# Patient Record
Sex: FEMALE | Race: WHITE | NOT HISPANIC OR LATINO | Employment: UNEMPLOYED | URBAN - METROPOLITAN AREA
[De-identification: names, ages, dates, MRNs, and addresses within clinical notes are randomized per-mention and may not be internally consistent; named-entity substitution may affect disease eponyms.]

---

## 2017-10-04 RX ORDER — ALBUTEROL SULFATE 90 UG/1
AEROSOL, METERED RESPIRATORY (INHALATION)
COMMUNITY
Start: 2017-09-20 | End: 2019-04-18 | Stop reason: ALTCHOICE

## 2017-10-04 RX ORDER — LOSARTAN POTASSIUM 50 MG/1
TABLET ORAL
COMMUNITY
Start: 2016-07-27 | End: 2018-11-28

## 2018-07-28 DIAGNOSIS — Z12.11 COLON CANCER SCREENING: ICD-10-CM

## 2018-11-28 ENCOUNTER — OFFICE VISIT (OUTPATIENT)
Dept: ORTHOPEDICS | Facility: CLINIC | Age: 53
End: 2018-11-28
Payer: COMMERCIAL

## 2018-11-28 ENCOUNTER — HOSPITAL ENCOUNTER (EMERGENCY)
Facility: HOSPITAL | Age: 53
Discharge: HOME | End: 2018-11-28
Attending: EMERGENCY MEDICINE
Payer: COMMERCIAL

## 2018-11-28 VITALS
BODY MASS INDEX: 33.56 KG/M2 | DIASTOLIC BLOOD PRESSURE: 71 MMHG | HEART RATE: 90 BPM | WEIGHT: 145 LBS | HEIGHT: 55 IN | SYSTOLIC BLOOD PRESSURE: 150 MMHG

## 2018-11-28 VITALS
HEART RATE: 96 BPM | OXYGEN SATURATION: 99 % | RESPIRATION RATE: 16 BRPM | TEMPERATURE: 98 F | DIASTOLIC BLOOD PRESSURE: 68 MMHG | WEIGHT: 145 LBS | HEIGHT: 55 IN | BODY MASS INDEX: 33.56 KG/M2 | SYSTOLIC BLOOD PRESSURE: 144 MMHG

## 2018-11-28 DIAGNOSIS — S72.401A CLOSED FRACTURE OF DISTAL END OF RIGHT FEMUR, UNSPECIFIED FRACTURE MORPHOLOGY, INITIAL ENCOUNTER: Primary | ICD-10-CM

## 2018-11-28 DIAGNOSIS — S72.451A CLOSED DISPLACED SUPRACONDYLAR FRACTURE OF DISTAL END OF RIGHT FEMUR WITHOUT INTRACONDYLAR EXTENSION, INITIAL ENCOUNTER: Primary | ICD-10-CM

## 2018-11-28 DIAGNOSIS — S89.91XA RIGHT KNEE INJURY, INITIAL ENCOUNTER: ICD-10-CM

## 2018-11-28 PROCEDURE — 99999 PR PBB SHADOW E&M-EST. PATIENT-LVL III: CPT | Mod: PBBFAC,,, | Performed by: ORTHOPAEDIC SURGERY

## 2018-11-28 PROCEDURE — 99283 EMERGENCY DEPT VISIT LOW MDM: CPT | Mod: 25

## 2018-11-28 PROCEDURE — 29505 APPLICATION LONG LEG SPLINT: CPT | Mod: RT

## 2018-11-28 PROCEDURE — 99203 OFFICE O/P NEW LOW 30 MIN: CPT | Mod: S$GLB,,, | Performed by: ORTHOPAEDIC SURGERY

## 2018-11-28 PROCEDURE — 97760 ORTHOTIC MGMT&TRAING 1ST ENC: CPT | Mod: GP,S$GLB,, | Performed by: ORTHOPAEDIC SURGERY

## 2018-11-28 NOTE — PROGRESS NOTES
Past Medical History:   Diagnosis Date    DVT (deep venous thrombosis) 1986    2 post delivery; left leg    Essential hypertension 2016    History of hydrocephalus as a child     Hyperlipidemia 2014    Hypertensive retinopathy     Spina bifida        Past Surgical History:   Procedure Laterality Date     SECTION      SHUNT REMOVAL      as child    spina bifida surgery         Current Outpatient Medications   Medication Sig    albuterol 90 mcg/actuation inhaler 1-2 inhalations every 4-6 hours as needed for wheezing. Dispense spacer as needed.     No current facility-administered medications for this visit.        Review of patient's allergies indicates:   Allergen Reactions    Codeine Itching       Family History   Problem Relation Age of Onset    Diabetes Maternal Grandmother     Hyperlipidemia Mother     Hyperlipidemia Father        Social History     Socioeconomic History    Marital status:      Spouse name: Not on file    Number of children: Not on file    Years of education: Not on file    Highest education level: Not on file   Social Needs    Financial resource strain: Not on file    Food insecurity - worry: Not on file    Food insecurity - inability: Not on file    Transportation needs - medical: Not on file    Transportation needs - non-medical: Not on file   Occupational History    Not on file   Tobacco Use    Smoking status: Former Smoker    Smokeless tobacco: Never Used   Substance and Sexual Activity    Alcohol use: No    Drug use: No    Sexual activity: Not on file   Other Topics Concern    Not on file   Social History Narrative    Not on file       Chief Complaint:   Chief Complaint   Patient presents with    Right Femur - Injury       History of present illness:  This is a 53-year-old paraplegic female seen after falling while trying to transfer from her wheelchair to the toilet.  Date of injury was 2018.  Patient was x-rayed at a  local emergency room and a distal femur fracture was identified. Patient was placed a posterior splint.  She is seen here for follow-up today.  Patient had a left tibia fracture last year treated with splinting without incident.  Patient does not transfer or walk using her legs.  She is in no pain.      Review of Systems:    Constitution: Negative for chills, fever, and sweats.  Negative for unexplained weight loss.    HENT:  Negative for headaches and blurry vision.    Cardiovascular:Negative for chest pain or irregular heart beat. Negative for hypertension.    Respiratory:  Negative for cough and shortness of breath.    Gastrointestinal: Negative for abdominal pain, heartburn, melena, nausea, and vomitting.    Genitourinary:  Negative bladder incontinence and dysuria.    Musculoskeletal:  See HPI    Neurological: Negative for numbness.    Psychiatric/Behavioral: Negative for depression.  The patient is not nervous/anxious.      Endocrine: Negative for polyuria    Hematologic/Lymphatic: Negative for bleeding problem.  Does not bruise/bleed easily.    Skin: Negative for poor would healing and rash      Physical Examination:    Vital Signs:    Vitals:    11/28/18 1023   BP: (!) 150/71   Pulse: 90       Body mass index is 33.7 kg/m².    This a well-developed, well nourished patient in no acute distress.  They are alert and oriented and cooperative to examination.  Pt.  Comes in in her wheelchair    Examination of the right leg shows some mild swelling around the knee and distal femur area. No tenderness. No gross crepitus.  Patient has some contractures in her legs and her knees are fixed at about a 30 degree position. No real light touch sensation.  Brisk capillary refill in all digits.    Examination of the left leg shows no abnormalities.  Brisk capillary refill.  No light touch sensation.    X-rays:  X-ray of the right knee is available from last night which show a minimally displaced comminuted distal femur  fracture     Assessment::  Right distal femur fracture    Plan:  I reviewed the findings with her and her  today. We will treat her non operatively in a brace.  Placed her in a locking range of motion brace in her position of comfort.  Follow up in 3 weeks with another x-ray of the right knee.    We performed a custom orthotic/brace fitting, adjusting and training with the patient. The patient demonstrated understanding and proper care. This was performed for 15 minutes.    This note was created using [employer] voice recognition software that occasionally misinterpreted phrases or words.    Consult note is delivered via Epic messaging service.

## 2018-11-28 NOTE — ED PROVIDER NOTES
"Encounter Date: 2018    SCRIBE #1 NOTE: I, Sylvia Zamorano, am scribing for, and in the presence of, Dr. Milligan.       History     Chief Complaint   Patient presents with    Knee Pain     Fell when getting out of her wheelchair at home and got right knee jammed and is hurting her       Time seen by provider: 1:47 AM on 2018    Nan Caraballo is a 53 y.o. female who presents to the ED complaining of right knee pain s/p fall 5 hours PTA. Pt states that she fell transferring from her wheelchair to the toilet. She reports hearing a "pop" when her knee hit the floor. Pt is paraplegic and notes that she does not have much feeling in her legs but notes some pain. The patient denies other symptoms at this time. PMHx includes DVT and HLD. No pertinent SHx. Allergies include codeine.      The history is provided by the patient.     Review of patient's allergies indicates:   Allergen Reactions    Codeine Itching     Past Medical History:   Diagnosis Date    DVT (deep venous thrombosis)     2 post delivery; left leg    Essential hypertension 2016    History of hydrocephalus as a child     Hyperlipidemia 2014    Hypertensive retinopathy     Spina bifida      Past Surgical History:   Procedure Laterality Date     SECTION      SHUNT REMOVAL      as child    spina bifida surgery       Family History   Problem Relation Age of Onset    Diabetes Maternal Grandmother     Hyperlipidemia Mother     Hyperlipidemia Father      Social History     Tobacco Use    Smoking status: Former Smoker    Smokeless tobacco: Never Used   Substance Use Topics    Alcohol use: No    Drug use: No     Review of Systems   Constitutional: Negative for fever.   HENT: Negative for congestion.    Eyes: Negative for visual disturbance.   Respiratory: Negative for wheezing.    Cardiovascular: Negative for chest pain.   Gastrointestinal: Negative for abdominal pain.   Genitourinary: Negative for dysuria. "   Musculoskeletal: Positive for arthralgias (right knee). Negative for joint swelling.   Skin: Negative for rash.   Neurological: Negative for syncope.   Hematological: Does not bruise/bleed easily.   Psychiatric/Behavioral: Negative for confusion.       Physical Exam     Initial Vitals [11/28/18 0138]   BP Pulse Resp Temp SpO2   (!) 180/81 109 16 98.2 °F (36.8 °C) 100 %      MAP       --         Physical Exam    Nursing note and vitals reviewed.  Constitutional: She appears well-nourished. She is Obese .   HENT:   Head: Normocephalic and atraumatic.   Eyes: Conjunctivae and EOM are normal.   Neck: Normal range of motion. Neck supple. No thyroid mass present.   Cardiovascular: Normal rate, regular rhythm and normal heart sounds. Exam reveals no gallop and no friction rub.    No murmur heard.  Pulmonary/Chest: Breath sounds normal. She has no wheezes. She has no rhonchi. She has no rales.   Abdominal: Soft. Normal appearance and bowel sounds are normal. There is no tenderness.   Musculoskeletal:        Right knee: She exhibits no swelling and no ecchymosis.   Atrophy of both lower extremities. Right knee and left knee are symmetric. Right knee is not swollen, red, or ecchymotic, and is without laxity. No pain with manipulation.   Neurological: She is alert and oriented to person, place, and time. She has normal strength. No cranial nerve deficit or sensory deficit.   Skin: Skin is warm and dry. No rash noted. No erythema.   Psychiatric: She has a normal mood and affect. Her speech is normal. Cognition and memory are normal.         ED Course   Splint Application  Date/Time: 11/28/2018 6:15 AM  Performed by: Cassius Milligan MD  Authorized by: Cassius Milligan MD   Consent Done: Not Needed  Location details: right leg  Splint type: long leg  Supplies used: cotton padding and Ortho-Glass  Post-procedure: The splinted body part was neurovascularly unchanged following the procedure.  Patient tolerance: Patient tolerated  the procedure well with no immediate complications        Labs Reviewed - No data to display       Imaging Results    None          Medical Decision Making:   History:   Old Medical Records: I decided to obtain old medical records.  Clinical Tests:   Radiological Study: Ordered and Reviewed  ED Management:  This patient was interviewed and examined emergently.  Initial vital signs are stable. X-ray of the affected knee does indicate a distal, comminuted, mildly angulated femur fracture that is closed.  There are soft adjacent compartments.  Patient is neurovascularly intact distally.  Ten years ago the patient incurred a proximal left tibial fracture which was left to heal in a splint.  At this time she reports no pain, the fracture is not intra-articular, it is not open and I do think the patient can be splinted for outpatient follow-up/treatment (which will most likely be conservative with splinting or casting).  The patient was asked if she wanted pain meds to be discharged and the stated I do not need them since I cannot feel the area.  She has her own wheelchair which she uses.  She is asked to follow up with an orthopedist as soon as possible regarding this injury and ultimate treatment.  She is asked to return to the ER for any new, concerning, or worsening symptoms, including leg discoloration, leg coldness, tight leg compartments that worsen or any pain.  Patient is agreeable with this plan for follow-up and she and her male  are discharged in stable condition with him as a .            Scribe Attestation:   Scribe #1: I performed the above scribed service and the documentation accurately describes the services I performed. I attest to the accuracy of the note.    I, Dr. Cassius Milligan, personally performed the services described in this documentation. All medical record entries made by the scribe were at my direction and in my presence.  I have reviewed the chart and agree that the record  reflects my personal performance and is accurate and complete. Cassius Milligan MD.  6:13 AM 11/28/2018             Clinical Impression:   The primary encounter diagnosis was Closed fracture of distal end of right femur, unspecified fracture morphology, initial encounter. A diagnosis of Right knee injury, initial encounter was also pertinent to this visit.      Disposition:   Disposition: Discharged  Condition: Stable                        Cassius Milligan MD  11/28/18 0615       Cassius Milligan MD  11/28/18 0616

## 2018-12-18 DIAGNOSIS — M25.561 RIGHT KNEE PAIN, UNSPECIFIED CHRONICITY: Primary | ICD-10-CM

## 2018-12-19 ENCOUNTER — HOSPITAL ENCOUNTER (OUTPATIENT)
Dept: RADIOLOGY | Facility: HOSPITAL | Age: 53
Discharge: HOME | End: 2018-12-19
Attending: ORTHOPAEDIC SURGERY
Payer: COMMERCIAL

## 2018-12-19 ENCOUNTER — OFFICE VISIT (OUTPATIENT)
Dept: ORTHOPEDICS | Facility: CLINIC | Age: 53
End: 2018-12-19
Payer: COMMERCIAL

## 2018-12-19 VITALS
WEIGHT: 145 LBS | BODY MASS INDEX: 33.56 KG/M2 | SYSTOLIC BLOOD PRESSURE: 147 MMHG | DIASTOLIC BLOOD PRESSURE: 67 MMHG | HEIGHT: 55 IN | HEART RATE: 69 BPM

## 2018-12-19 DIAGNOSIS — M25.561 RIGHT KNEE PAIN, UNSPECIFIED CHRONICITY: ICD-10-CM

## 2018-12-19 DIAGNOSIS — S72.451D CLOSED DISPLACED SUPRACONDYLAR FRACTURE OF DISTAL END OF RIGHT FEMUR WITHOUT INTRACONDYLAR EXTENSION WITH ROUTINE HEALING, SUBSEQUENT ENCOUNTER: Primary | ICD-10-CM

## 2018-12-19 PROCEDURE — 99999 PR PBB SHADOW E&M-EST. PATIENT-LVL III: CPT | Mod: PBBFAC,,, | Performed by: ORTHOPAEDIC SURGERY

## 2018-12-19 PROCEDURE — 73560 X-RAY EXAM OF KNEE 1 OR 2: CPT | Mod: 26,RT,, | Performed by: RADIOLOGY

## 2018-12-19 PROCEDURE — 73560 X-RAY EXAM OF KNEE 1 OR 2: CPT | Mod: TC,PO,RT

## 2018-12-19 PROCEDURE — 99213 OFFICE O/P EST LOW 20 MIN: CPT | Mod: S$GLB,,, | Performed by: ORTHOPAEDIC SURGERY

## 2018-12-19 NOTE — PROGRESS NOTES
Past Medical History:   Diagnosis Date    DVT (deep venous thrombosis) 1986    2 post delivery; left leg    Essential hypertension 2016    History of hydrocephalus as a child     Hyperlipidemia 2014    Hypertensive retinopathy     Spina bifida        Past Surgical History:   Procedure Laterality Date     SECTION      SHUNT REMOVAL      as child    spina bifida surgery         Current Outpatient Medications   Medication Sig    albuterol 90 mcg/actuation inhaler 1-2 inhalations every 4-6 hours as needed for wheezing. Dispense spacer as needed.     No current facility-administered medications for this visit.        Review of patient's allergies indicates:   Allergen Reactions    Codeine Itching       Family History   Problem Relation Age of Onset    Diabetes Maternal Grandmother     Hyperlipidemia Mother     Hyperlipidemia Father        Social History     Socioeconomic History    Marital status:      Spouse name: Not on file    Number of children: Not on file    Years of education: Not on file    Highest education level: Not on file   Social Needs    Financial resource strain: Not on file    Food insecurity - worry: Not on file    Food insecurity - inability: Not on file    Transportation needs - medical: Not on file    Transportation needs - non-medical: Not on file   Occupational History    Not on file   Tobacco Use    Smoking status: Former Smoker    Smokeless tobacco: Never Used   Substance and Sexual Activity    Alcohol use: No    Drug use: No    Sexual activity: Not on file   Other Topics Concern    Not on file   Social History Narrative    Not on file       Chief Complaint:   Chief Complaint   Patient presents with    Leg Pain     R femur fx 3wk f/u       History of present illness:  This is a 53-year-old paraplegic female seen after falling while trying to transfer from her wheelchair to the toilet.  Date of injury was 2018.  Patient was  x-rayed at a local emergency room and a distal femur fracture was identified.  She is in the range of motion brace.  She is seen here for follow-up today.  Patient had a left tibia fracture last year treated with splinting without incident.  Patient does not transfer or walk using her legs.  She is in no pain.      Review of Systems:    Constitution: Negative for chills, fever, and sweats.  Negative for unexplained weight loss.    HENT:  Negative for headaches and blurry vision.    Cardiovascular:Negative for chest pain or irregular heart beat. Negative for hypertension.    Respiratory:  Negative for cough and shortness of breath.    Gastrointestinal: Negative for abdominal pain, heartburn, melena, nausea, and vomitting.    Genitourinary:  Negative bladder incontinence and dysuria.    Musculoskeletal:  See HPI    Neurological: Negative for numbness.    Psychiatric/Behavioral: Negative for depression.  The patient is not nervous/anxious.      Endocrine: Negative for polyuria    Hematologic/Lymphatic: Negative for bleeding problem.  Does not bruise/bleed easily.    Skin: Negative for poor would healing and rash      Physical Examination:    Vital Signs:    Vitals:    12/19/18 1023   BP: (!) 147/67   Pulse: 69       Body mass index is 33.7 kg/m².    This a well-developed, well nourished patient in no acute distress.  They are alert and oriented and cooperative to examination.  Pt.  Comes in in her wheelchair    Examination of the right leg shows some mild swelling around the knee and distal femur area. No tenderness. No gross crepitus.  Patient has some contractures in her legs and her knees are fixed at about a 30 degree position. No real light touch sensation.  Brisk capillary refill in all digits.    X-rays:  X-ray of the right knee is ordered and reviewed which show a minimally displaced comminuted distal femur fracture with possible some early callus formation     Assessment::  Right distal femur fracture    Plan:   I reviewed the findings with her and her  today. We will continue to treat her non operatively in a brace.  Continue the brace.  Follow up in 3 weeks with another x-ray of the right knee.    This note was created using [email or web address] voice recognition software that occasionally misinterpreted phrases or words.    Consult note is delivered via Epic messaging service.

## 2019-01-03 DIAGNOSIS — M25.561 RIGHT KNEE PAIN, UNSPECIFIED CHRONICITY: Primary | ICD-10-CM

## 2019-01-07 ENCOUNTER — TELEPHONE (OUTPATIENT)
Dept: ORTHOPEDICS | Facility: CLINIC | Age: 54
End: 2019-01-07

## 2019-01-07 NOTE — TELEPHONE ENCOUNTER
----- Message from Gabriela Floyd sent at 1/7/2019  9:19 AM CST -----  Contact: Larru-  Type: Needs Medical Advice    Who Called:  Greg-spouse  Best Call Back Number: [phone] (home)   Additional Information:was told to call about changing appt for this Thursday.

## 2019-01-09 ENCOUNTER — HOSPITAL ENCOUNTER (OUTPATIENT)
Dept: RADIOLOGY | Facility: HOSPITAL | Age: 54
Discharge: HOME | End: 2019-01-09
Attending: ORTHOPAEDIC SURGERY
Payer: COMMERCIAL

## 2019-01-09 ENCOUNTER — OFFICE VISIT (OUTPATIENT)
Dept: ORTHOPEDICS | Facility: CLINIC | Age: 54
End: 2019-01-09
Payer: COMMERCIAL

## 2019-01-09 VITALS
SYSTOLIC BLOOD PRESSURE: 140 MMHG | WEIGHT: 145 LBS | HEART RATE: 70 BPM | BODY MASS INDEX: 33.56 KG/M2 | DIASTOLIC BLOOD PRESSURE: 71 MMHG | HEIGHT: 55 IN

## 2019-01-09 DIAGNOSIS — M25.561 RIGHT KNEE PAIN, UNSPECIFIED CHRONICITY: ICD-10-CM

## 2019-01-09 DIAGNOSIS — S72.451D CLOSED DISPLACED SUPRACONDYLAR FRACTURE OF DISTAL END OF RIGHT FEMUR WITHOUT INTRACONDYLAR EXTENSION WITH ROUTINE HEALING, SUBSEQUENT ENCOUNTER: Primary | ICD-10-CM

## 2019-01-09 PROCEDURE — 73560 XR KNEE 1 OR 2 VIEW RIGHT: ICD-10-PCS | Mod: 26,RT,, | Performed by: RADIOLOGY

## 2019-01-09 PROCEDURE — 99213 OFFICE O/P EST LOW 20 MIN: CPT | Mod: S$GLB,,, | Performed by: ORTHOPAEDIC SURGERY

## 2019-01-09 PROCEDURE — 99999 PR PBB SHADOW E&M-EST. PATIENT-LVL III: ICD-10-PCS | Mod: PBBFAC,,, | Performed by: ORTHOPAEDIC SURGERY

## 2019-01-09 PROCEDURE — 73560 X-RAY EXAM OF KNEE 1 OR 2: CPT | Mod: 26,RT,, | Performed by: RADIOLOGY

## 2019-01-09 PROCEDURE — 99213 PR OFFICE/OUTPT VISIT, EST, LEVL III, 20-29 MIN: ICD-10-PCS | Mod: S$GLB,,, | Performed by: ORTHOPAEDIC SURGERY

## 2019-01-09 PROCEDURE — 99999 PR PBB SHADOW E&M-EST. PATIENT-LVL III: CPT | Mod: PBBFAC,,, | Performed by: ORTHOPAEDIC SURGERY

## 2019-01-09 PROCEDURE — 73560 X-RAY EXAM OF KNEE 1 OR 2: CPT | Mod: TC,PO,RT

## 2019-01-09 NOTE — PROGRESS NOTES
Past Medical History:   Diagnosis Date    DVT (deep venous thrombosis) 1986    2 post delivery; left leg    Essential hypertension 2016    History of hydrocephalus as a child     Hyperlipidemia 2014    Hypertensive retinopathy     Spina bifida        Past Surgical History:   Procedure Laterality Date     SECTION      SHUNT REMOVAL      as child    spina bifida surgery         Current Outpatient Medications   Medication Sig    albuterol 90 mcg/actuation inhaler 1-2 inhalations every 4-6 hours as needed for wheezing. Dispense spacer as needed.     No current facility-administered medications for this visit.        Review of patient's allergies indicates:   Allergen Reactions    Codeine Itching       Family History   Problem Relation Age of Onset    Diabetes Maternal Grandmother     Hyperlipidemia Mother     Hyperlipidemia Father        Social History     Socioeconomic History    Marital status:      Spouse name: Not on file    Number of children: Not on file    Years of education: Not on file    Highest education level: Not on file   Social Needs    Financial resource strain: Not on file    Food insecurity - worry: Not on file    Food insecurity - inability: Not on file    Transportation needs - medical: Not on file    Transportation needs - non-medical: Not on file   Occupational History    Not on file   Tobacco Use    Smoking status: Former Smoker    Smokeless tobacco: Never Used   Substance and Sexual Activity    Alcohol use: No    Drug use: No    Sexual activity: Not on file   Other Topics Concern    Not on file   Social History Narrative    Not on file       Chief Complaint:   Chief Complaint   Patient presents with    Knee Pain     right knee 3wk f/u       History of present illness:  This is a 53-year-old paraplegic female seen after falling while trying to transfer from her wheelchair to the toilet.  Date of injury was 2018.  Patient was  x-rayed at a local emergency room and a distal femur fracture was identified.  She is in the range of motion brace.  She is seen here for follow-up today.  Patient had a left tibia fracture last year treated with splinting without incident.  Patient does not transfer or walk using her legs.  She is in no pain.      Review of Systems:    Constitution: Negative for chills, fever, and sweats.  Negative for unexplained weight loss.    HENT:  Negative for headaches and blurry vision.    Cardiovascular:Negative for chest pain or irregular heart beat. Negative for hypertension.    Respiratory:  Negative for cough and shortness of breath.    Gastrointestinal: Negative for abdominal pain, heartburn, melena, nausea, and vomitting.    Genitourinary:  Negative bladder incontinence and dysuria.    Musculoskeletal:  See HPI    Neurological: Negative for numbness.    Psychiatric/Behavioral: Negative for depression.  The patient is not nervous/anxious.      Endocrine: Negative for polyuria    Hematologic/Lymphatic: Negative for bleeding problem.  Does not bruise/bleed easily.    Skin: Negative for poor would healing and rash      Physical Examination:    Vital Signs:    Vitals:    01/09/19 0954   BP: (!) 140/71   Pulse: 70       Body mass index is 33.7 kg/m².    This a well-developed, well nourished patient in no acute distress.  They are alert and oriented and cooperative to examination.  Pt.  Comes in in her wheelchair    Examination of the right leg shows some mild swelling around the knee and distal femur area. No tenderness. No gross crepitus.  Patient has some contractures in her legs and her knees are fixed at about a 30 degree position. No real light touch sensation.  Brisk capillary refill in all digits.    X-rays:  X-ray of the right knee is ordered and reviewed which show a minimally displaced comminuted distal femur fracture with some early callus formation     Assessment::  Right distal femur fracture    Plan:  I  reviewed the findings with her and her  today. she can start to come out of the brace more and more.  Follow up in 4 weeks with another x-ray of the right knee.    This note was created using [employer] voice recognition software that occasionally misinterpreted phrases or words.    Consult note is delivered via Epic messaging service.

## 2019-02-08 DIAGNOSIS — M25.561 RIGHT KNEE PAIN, UNSPECIFIED CHRONICITY: Primary | ICD-10-CM

## 2019-02-14 ENCOUNTER — OFFICE VISIT (OUTPATIENT)
Dept: ORTHOPEDICS | Facility: CLINIC | Age: 54
End: 2019-02-14
Payer: COMMERCIAL

## 2019-02-14 ENCOUNTER — HOSPITAL ENCOUNTER (OUTPATIENT)
Dept: RADIOLOGY | Facility: HOSPITAL | Age: 54
Discharge: HOME | End: 2019-02-14
Attending: ORTHOPAEDIC SURGERY
Payer: COMMERCIAL

## 2019-02-14 VITALS
SYSTOLIC BLOOD PRESSURE: 169 MMHG | DIASTOLIC BLOOD PRESSURE: 79 MMHG | HEART RATE: 77 BPM | BODY MASS INDEX: 33.56 KG/M2 | WEIGHT: 145 LBS | HEIGHT: 55 IN

## 2019-02-14 DIAGNOSIS — M25.561 RIGHT KNEE PAIN, UNSPECIFIED CHRONICITY: ICD-10-CM

## 2019-02-14 DIAGNOSIS — S72.451D CLOSED DISPLACED SUPRACONDYLAR FRACTURE OF DISTAL END OF RIGHT FEMUR WITHOUT INTRACONDYLAR EXTENSION WITH ROUTINE HEALING, SUBSEQUENT ENCOUNTER: Primary | ICD-10-CM

## 2019-02-14 PROCEDURE — 73560 XR KNEE 1 OR 2 VIEW RIGHT: ICD-10-PCS | Mod: 26,RT,, | Performed by: RADIOLOGY

## 2019-02-14 PROCEDURE — 99213 OFFICE O/P EST LOW 20 MIN: CPT | Mod: S$GLB,,, | Performed by: ORTHOPAEDIC SURGERY

## 2019-02-14 PROCEDURE — 99999 PR PBB SHADOW E&M-EST. PATIENT-LVL III: ICD-10-PCS | Mod: PBBFAC,,, | Performed by: ORTHOPAEDIC SURGERY

## 2019-02-14 PROCEDURE — 99999 PR PBB SHADOW E&M-EST. PATIENT-LVL III: CPT | Mod: PBBFAC,,, | Performed by: ORTHOPAEDIC SURGERY

## 2019-02-14 PROCEDURE — 73560 X-RAY EXAM OF KNEE 1 OR 2: CPT | Mod: 26,RT,, | Performed by: RADIOLOGY

## 2019-02-14 PROCEDURE — 73560 X-RAY EXAM OF KNEE 1 OR 2: CPT | Mod: TC,PN,RT

## 2019-02-14 PROCEDURE — 99213 PR OFFICE/OUTPT VISIT, EST, LEVL III, 20-29 MIN: ICD-10-PCS | Mod: S$GLB,,, | Performed by: ORTHOPAEDIC SURGERY

## 2019-02-14 NOTE — PROGRESS NOTES
Past Medical History:   Diagnosis Date    DVT (deep venous thrombosis) 1986    2 post delivery; left leg    Essential hypertension 2016    History of hydrocephalus as a child     Hyperlipidemia 2014    Hypertensive retinopathy     Spina bifida        Past Surgical History:   Procedure Laterality Date     SECTION      SHUNT REMOVAL      as child    spina bifida surgery         Current Outpatient Medications   Medication Sig    albuterol 90 mcg/actuation inhaler 1-2 inhalations every 4-6 hours as needed for wheezing. Dispense spacer as needed.     No current facility-administered medications for this visit.        Review of patient's allergies indicates:   Allergen Reactions    Codeine Itching       Family History   Problem Relation Age of Onset    Diabetes Maternal Grandmother     Hyperlipidemia Mother     Hyperlipidemia Father        Social History     Socioeconomic History    Marital status:      Spouse name: Not on file    Number of children: Not on file    Years of education: Not on file    Highest education level: Not on file   Social Needs    Financial resource strain: Not on file    Food insecurity - worry: Not on file    Food insecurity - inability: Not on file    Transportation needs - medical: Not on file    Transportation needs - non-medical: Not on file   Occupational History    Not on file   Tobacco Use    Smoking status: Former Smoker    Smokeless tobacco: Never Used   Substance and Sexual Activity    Alcohol use: No    Drug use: No    Sexual activity: Not on file   Other Topics Concern    Not on file   Social History Narrative    Not on file       Chief Complaint:   Chief Complaint   Patient presents with    Leg Injury     right femur fracture follow up [facility] 18        History of present illness:  This is a 53-year-old paraplegic female seen after falling while trying to transfer from her wheelchair to the toilet.  Date of injury was November  27, 2018.  Patient was x-rayed at a local emergency room and a distal femur fracture was identified. She is seen here for follow-up today.  Patient had a left tibia fracture last year treated with splinting without incident.  Patient does not transfer or walk using her legs.  She is in no pain. Popping is improving.      Review of Systems:    Musculoskeletal:  See HPI      Physical Examination:    Vital Signs:    Vitals:    02/14/19 1008   BP: (!) 169/79   Pulse: 77       Body mass index is 33.7 kg/m².    This a well-developed, well nourished patient in no acute distress.  They are alert and oriented and cooperative to examination.  Pt.  Comes in in her wheelchair    Examination of the right leg shows some mild swelling around the knee and distal femur area. No tenderness. No gross crepitus.  Patient has some contractures in her legs and her knees are fixed at about a 30 degree position. No real light touch sensation.  Brisk capillary refill in all digits.    X-rays:  X-ray of the right knee is ordered and reviewed which show a minimally displaced comminuted distal femur fracture with some continued callus formation     Assessment::  Right distal femur fracture    Plan:  I reviewed the findings with her and her  today.  Follow up in 2 months with final x-ray of the right knee.    This note was created using [employer] voice recognition software that occasionally misinterpreted phrases or words.    Consult note is delivered via Epic messaging service.

## 2019-04-16 DIAGNOSIS — M25.561 RIGHT KNEE PAIN, UNSPECIFIED CHRONICITY: Primary | ICD-10-CM

## 2019-04-18 ENCOUNTER — OFFICE VISIT (OUTPATIENT)
Dept: ORTHOPEDICS | Facility: CLINIC | Age: 54
End: 2019-04-18
Payer: COMMERCIAL

## 2019-04-18 ENCOUNTER — HOSPITAL ENCOUNTER (OUTPATIENT)
Dept: RADIOLOGY | Facility: HOSPITAL | Age: 54
Discharge: HOME | End: 2019-04-18
Attending: ORTHOPAEDIC SURGERY
Payer: COMMERCIAL

## 2019-04-18 VITALS
DIASTOLIC BLOOD PRESSURE: 73 MMHG | WEIGHT: 145 LBS | SYSTOLIC BLOOD PRESSURE: 151 MMHG | BODY MASS INDEX: 33.56 KG/M2 | HEIGHT: 55 IN | HEART RATE: 83 BPM

## 2019-04-18 DIAGNOSIS — S72.451D CLOSED DISPLACED SUPRACONDYLAR FRACTURE OF DISTAL END OF RIGHT FEMUR WITHOUT INTRACONDYLAR EXTENSION WITH ROUTINE HEALING, SUBSEQUENT ENCOUNTER: Primary | ICD-10-CM

## 2019-04-18 DIAGNOSIS — M25.561 RIGHT KNEE PAIN, UNSPECIFIED CHRONICITY: ICD-10-CM

## 2019-04-18 PROCEDURE — 73560 X-RAY EXAM OF KNEE 1 OR 2: CPT | Mod: TC,PN,RT

## 2019-04-18 PROCEDURE — 99213 OFFICE O/P EST LOW 20 MIN: CPT | Mod: S$GLB,,, | Performed by: ORTHOPAEDIC SURGERY

## 2019-04-18 PROCEDURE — 99999 PR PBB SHADOW E&M-EST. PATIENT-LVL III: ICD-10-PCS | Mod: PBBFAC,,, | Performed by: ORTHOPAEDIC SURGERY

## 2019-04-18 PROCEDURE — 99999 PR PBB SHADOW E&M-EST. PATIENT-LVL III: CPT | Mod: PBBFAC,,, | Performed by: ORTHOPAEDIC SURGERY

## 2019-04-18 PROCEDURE — 73560 XR KNEE 1 OR 2 VIEW RIGHT: ICD-10-PCS | Mod: 26,RT,, | Performed by: RADIOLOGY

## 2019-04-18 PROCEDURE — 99213 PR OFFICE/OUTPT VISIT, EST, LEVL III, 20-29 MIN: ICD-10-PCS | Mod: S$GLB,,, | Performed by: ORTHOPAEDIC SURGERY

## 2019-04-18 PROCEDURE — 73560 X-RAY EXAM OF KNEE 1 OR 2: CPT | Mod: 26,RT,, | Performed by: RADIOLOGY

## 2019-04-18 NOTE — PROGRESS NOTES
Past Medical History:   Diagnosis Date    DVT (deep venous thrombosis) 1986    2 post delivery; left leg    Essential hypertension 2016    History of hydrocephalus as a child     Hyperlipidemia 2014    Hypertensive retinopathy     Spina bifida        Past Surgical History:   Procedure Laterality Date     SECTION      SHUNT REMOVAL      as child    spina bifida surgery         No current outpatient medications on file.     No current facility-administered medications for this visit.        Review of patient's allergies indicates:   Allergen Reactions    Codeine Itching       Family History   Problem Relation Age of Onset    Diabetes Maternal Grandmother     Hyperlipidemia Mother     Hyperlipidemia Father        Social History     Socioeconomic History    Marital status:      Spouse name: Not on file    Number of children: Not on file    Years of education: Not on file    Highest education level: Not on file   Occupational History    Not on file   Social Needs    Financial resource strain: Not on file    Food insecurity:     Worry: Not on file     Inability: Not on file    Transportation needs:     Medical: Not on file     Non-medical: Not on file   Tobacco Use    Smoking status: Former Smoker    Smokeless tobacco: Never Used   Substance and Sexual Activity    Alcohol use: No    Drug use: No    Sexual activity: Not on file   Lifestyle    Physical activity:     Days per week: Not on file     Minutes per session: Not on file    Stress: Not on file   Relationships    Social connections:     Talks on phone: Not on file     Gets together: Not on file     Attends Christianity service: Not on file     Active member of club or organization: Not on file     Attends meetings of clubs or organizations: Not on file     Relationship status: Not on file   Other Topics Concern    Not on file   Social History Narrative    Not on file       Chief Complaint:   Chief Complaint    Patient presents with    Leg Injury     left femur fracture DOI 11/27/18        History of present illness:  This is a 54-year-old paraplegic female seen after falling while trying to transfer from her wheelchair to the toilet.  Date of injury was November 27, 2018.  Patient was x-rayed at a local emergency room and a distal femur fracture was identified. She is seen here for follow-up today.  Patient had a left tibia fracture last year treated with splinting without incident.  Patient does not transfer or walk using her legs.  She is in no pain. Popping is improving.      Review of Systems:    Musculoskeletal:  See HPI      Physical Examination:    Vital Signs:    Vitals:    04/18/19 0958   BP: (!) 151/73   Pulse: 83       Body mass index is 33.7 kg/m².    This a well-developed, well nourished patient in no acute distress.  They are alert and oriented and cooperative to examination.  Pt.  Comes in in her wheelchair    Examination of the right leg shows some mild swelling around the knee and distal femur area. No tenderness. No gross crepitus.  Patient has some contractures in her legs and her knees are fixed at about a 30 degree position. No real light touch sensation.  Brisk capillary refill in all digits.    X-rays:  X-ray of the right knee is ordered and reviewed which show a minimally displaced comminuted distal femur fracture with some continued callus formation     Assessment::  Right distal femur fracture    Plan:  I reviewed the findings with her and her  today.  Follow up in 3 months with final x-ray of the right knee.    This note was created using [employer] voice recognition software that occasionally misinterpreted phrases or words.    Consult note is delivered via Epic messaging service.

## 2019-07-09 DIAGNOSIS — M25.561 RIGHT KNEE PAIN, UNSPECIFIED CHRONICITY: Primary | ICD-10-CM

## 2019-07-11 ENCOUNTER — HOSPITAL ENCOUNTER (OUTPATIENT)
Dept: RADIOLOGY | Facility: HOSPITAL | Age: 54
Discharge: HOME | End: 2019-07-11
Attending: ORTHOPAEDIC SURGERY
Payer: COMMERCIAL

## 2019-07-11 ENCOUNTER — OFFICE VISIT (OUTPATIENT)
Dept: ORTHOPEDICS | Facility: CLINIC | Age: 54
End: 2019-07-11
Payer: COMMERCIAL

## 2019-07-11 VITALS — WEIGHT: 145 LBS | BODY MASS INDEX: 33.56 KG/M2 | RESPIRATION RATE: 18 BRPM | HEIGHT: 55 IN

## 2019-07-11 DIAGNOSIS — M25.561 RIGHT KNEE PAIN, UNSPECIFIED CHRONICITY: ICD-10-CM

## 2019-07-11 DIAGNOSIS — S72.451D CLOSED DISPLACED SUPRACONDYLAR FRACTURE OF DISTAL END OF RIGHT FEMUR WITHOUT INTRACONDYLAR EXTENSION WITH ROUTINE HEALING, SUBSEQUENT ENCOUNTER: Primary | ICD-10-CM

## 2019-07-11 PROCEDURE — 99213 PR OFFICE/OUTPT VISIT, EST, LEVL III, 20-29 MIN: ICD-10-PCS | Mod: S$GLB,,, | Performed by: ORTHOPAEDIC SURGERY

## 2019-07-11 PROCEDURE — 99999 PR PBB SHADOW E&M-EST. PATIENT-LVL III: CPT | Mod: PBBFAC,,, | Performed by: ORTHOPAEDIC SURGERY

## 2019-07-11 PROCEDURE — 99213 OFFICE O/P EST LOW 20 MIN: CPT | Mod: S$GLB,,, | Performed by: ORTHOPAEDIC SURGERY

## 2019-07-11 PROCEDURE — 99999 PR PBB SHADOW E&M-EST. PATIENT-LVL III: ICD-10-PCS | Mod: PBBFAC,,, | Performed by: ORTHOPAEDIC SURGERY

## 2019-07-11 PROCEDURE — 73560 X-RAY EXAM OF KNEE 1 OR 2: CPT | Mod: TC,PN,RT

## 2019-07-11 PROCEDURE — 73560 XR KNEE 1 OR 2 VIEW RIGHT: ICD-10-PCS | Mod: 26,RT,, | Performed by: RADIOLOGY

## 2019-07-11 PROCEDURE — 73560 X-RAY EXAM OF KNEE 1 OR 2: CPT | Mod: 26,RT,, | Performed by: RADIOLOGY

## 2019-07-11 NOTE — PROGRESS NOTES
Past Medical History:   Diagnosis Date    DVT (deep venous thrombosis) 1986    2 post delivery; left leg    Essential hypertension 2016    History of hydrocephalus as a child     Hyperlipidemia 2014    Hypertensive retinopathy     Spina bifida        Past Surgical History:   Procedure Laterality Date     SECTION      SHUNT REMOVAL      as child    spina bifida surgery         No current outpatient medications on file.     No current facility-administered medications for this visit.        Review of patient's allergies indicates:   Allergen Reactions    Codeine Itching       Family History   Problem Relation Age of Onset    Diabetes Maternal Grandmother     Hyperlipidemia Mother     Hyperlipidemia Father        Social History     Socioeconomic History    Marital status:      Spouse name: Not on file    Number of children: Not on file    Years of education: Not on file    Highest education level: Not on file   Occupational History    Not on file   Social Needs    Financial resource strain: Not on file    Food insecurity:     Worry: Not on file     Inability: Not on file    Transportation needs:     Medical: Not on file     Non-medical: Not on file   Tobacco Use    Smoking status: Former Smoker    Smokeless tobacco: Never Used   Substance and Sexual Activity    Alcohol use: No    Drug use: No    Sexual activity: Not on file   Lifestyle    Physical activity:     Days per week: Not on file     Minutes per session: Not on file    Stress: Not on file   Relationships    Social connections:     Talks on phone: Not on file     Gets together: Not on file     Attends Mandaeism service: Not on file     Active member of club or organization: Not on file     Attends meetings of clubs or organizations: Not on file     Relationship status: Not on file   Other Topics Concern    Not on file   Social History Narrative    Not on file       Chief Complaint:   Chief Complaint    Patient presents with    Leg Pain     right femur fracture follow up DOI 11/27/18       History of present illness:  This is a 54-year-old paraplegic female seen after falling while trying to transfer from her wheelchair to the toilet.  Date of injury was November 27, 2018.  Patient was x-rayed at a local emergency room and a distal femur fracture was identified. She is seen here for follow-up today.  Patient had a left tibia fracture last year treated with splinting without incident.  Patient does not transfer or walk using her legs.  She is in no pain.    Review of Systems:    Musculoskeletal:  See HPI      Physical Examination:    Vital Signs:    Vitals:    07/11/19 1000   Resp: 18       Body mass index is 33.7 kg/m².    This a well-developed, well nourished patient in no acute distress.  They are alert and oriented and cooperative to examination.  Pt.  Comes in in her wheelchair    Examination of the right leg shows some mild swelling around the knee and distal femur area. No tenderness. No gross crepitus.  Patient has some contractures in her legs and her knees are fixed at about a 30 degree position. No real light touch sensation.  Brisk capillary refill in all digits.    X-rays:  X-ray of the right knee is ordered and reviewed which show a mildly displaced comminuted distal femur fracture with some continued callus formation     Assessment::  Right distal femur fracture    Plan:  I reviewed the findings with her and her  today. Follow-up as needed    This note was created using [employer] voice recognition software that occasionally misinterpreted phrases or words.    Consult note is delivered via Epic messaging service.

## 2021-05-10 ENCOUNTER — PATIENT MESSAGE (OUTPATIENT)
Dept: RESEARCH | Facility: HOSPITAL | Age: 56
End: 2021-05-10

## 2023-08-31 DIAGNOSIS — R10.13 ABDOMINAL PAIN, EPIGASTRIC: Primary | ICD-10-CM

## 2023-08-31 DIAGNOSIS — D50.9 IRON DEFICIENCY ANEMIA, UNSPECIFIED: ICD-10-CM

## 2023-09-12 ENCOUNTER — HOSPITAL ENCOUNTER (OUTPATIENT)
Dept: RADIOLOGY | Facility: HOSPITAL | Age: 58
Discharge: HOME | End: 2023-09-12
Attending: INTERNAL MEDICINE
Payer: COMMERCIAL

## 2023-09-12 DIAGNOSIS — D50.9 IRON DEFICIENCY ANEMIA, UNSPECIFIED: ICD-10-CM

## 2023-09-12 DIAGNOSIS — R10.13 ABDOMINAL PAIN, EPIGASTRIC: ICD-10-CM

## 2023-09-12 PROCEDURE — 76705 ECHO EXAM OF ABDOMEN: CPT | Mod: TC,PO

## 2024-03-12 ENCOUNTER — HOSPITAL ENCOUNTER (OUTPATIENT)
Dept: PREADMISSION TESTING | Facility: HOSPITAL | Age: 59
Discharge: HOME | End: 2024-03-12
Attending: INTERNAL MEDICINE
Payer: COMMERCIAL

## 2024-03-12 VITALS
RESPIRATION RATE: 16 BRPM | SYSTOLIC BLOOD PRESSURE: 171 MMHG | OXYGEN SATURATION: 98 % | HEART RATE: 75 BPM | DIASTOLIC BLOOD PRESSURE: 94 MMHG | WEIGHT: 141 LBS | HEIGHT: 55 IN | BODY MASS INDEX: 32.63 KG/M2

## 2024-03-12 DIAGNOSIS — Z01.818 PREOP TESTING: Primary | ICD-10-CM

## 2024-03-12 PROCEDURE — 93005 ELECTROCARDIOGRAM TRACING: CPT | Performed by: GENERAL PRACTICE

## 2024-03-12 PROCEDURE — 93010 ELECTROCARDIOGRAM REPORT: CPT | Mod: ,,, | Performed by: GENERAL PRACTICE

## 2024-03-12 NOTE — DISCHARGE INSTRUCTIONS
INSTRUCTIONS  To confirm your doctor has scheduled your surgery for:  03/15    Morning of surgery please check in with registration near [address] Entrance then proceed to Registration then to endoscopy department    ENDOSCOPY NURSES will call the afternoon prior to procedure with your final arrival time.    TAKE ONLY THESE MEDICATIONS WITH A SMALL SIP OF WATER THE MORNING OF SURGERY: none      DO NOT TAKE THESE MEDICATIONS 5-7 DAYS PRIOR to your procedure per your surgeon's request: ASPIRIN, ALEVE, BC powder, FER SELTZER, IBUPROFEN, FISH OIL, VITAMIN E, OR HERBALS   (May take Tylenol)    If you are prescribed any types of blood thinners (Aspirin, Coumadin, Plavix, Pradaxa, Xarelto, Aggrenox, Effient, Eliquis, Savasya, Brilinta or any other), please ask your surgeon how many days before scheduled procedure should you stop taking them. You may also need to verify with prescribing physician if it is OK to stop your blood thinners.      INSTRUCTIONS IMPORTANT!!  Do not eat or drink anything between midnight and the time of your procedure- this includes gum, mints, and candy. You may brush your teeth but do not swallow.  ONLY if you are diabetic, check your sugar in the morning before your procedure.  Do not smoke, vape or drink alcoholic beverages 24 hours prior to your procedure.  If you wear contact lenses, dentures, hearing aids or glasses, bring a container to put them in during surgery and give to a family member for safe keeping.    Please leave all jewelry, piercing's and valuables at home.   Wear comfortable loose clothing and rubber soled shoes.  If your condition changes such as fever, cough, etc, please notify your surgeon.   ONLY for patients requiring bowel prep, written instructions will be given by your doctor's office.  Make arrangements in advance for transportation home by a responsible adult.  You must make arrangements for transportation, TAXI'S, UBER'S OR LYFTS ARE NOT ALLOWED.        If you  have any questions about these instructions, call Endoscopy Nurse at [phone]

## 2024-03-15 ENCOUNTER — HOSPITAL ENCOUNTER (OUTPATIENT)
Facility: HOSPITAL | Age: 59
Discharge: HOME | End: 2024-03-15
Attending: INTERNAL MEDICINE | Admitting: INTERNAL MEDICINE
Payer: COMMERCIAL

## 2024-03-15 ENCOUNTER — ANESTHESIA (OUTPATIENT)
Dept: SURGERY | Facility: HOSPITAL | Age: 59
End: 2024-03-15
Payer: COMMERCIAL

## 2024-03-15 ENCOUNTER — ANESTHESIA EVENT (OUTPATIENT)
Dept: SURGERY | Facility: HOSPITAL | Age: 59
End: 2024-03-15
Payer: COMMERCIAL

## 2024-03-15 VITALS
HEART RATE: 76 BPM | OXYGEN SATURATION: 96 % | DIASTOLIC BLOOD PRESSURE: 78 MMHG | SYSTOLIC BLOOD PRESSURE: 134 MMHG | TEMPERATURE: 98 F | RESPIRATION RATE: 18 BRPM

## 2024-03-15 DIAGNOSIS — D13.5 ADENOMYOMATOSIS OF GALLBLADDER: ICD-10-CM

## 2024-03-15 PROCEDURE — 25000003 PHARM REV CODE 250: Performed by: NURSE ANESTHETIST, CERTIFIED REGISTERED

## 2024-03-15 PROCEDURE — D9220A PRA ANESTHESIA: Mod: 33,ANES,, | Performed by: ANESTHESIOLOGY

## 2024-03-15 PROCEDURE — D9220A PRA ANESTHESIA: Mod: 33,CRNA,, | Performed by: NURSE ANESTHETIST, CERTIFIED REGISTERED

## 2024-03-15 PROCEDURE — 27200043 HC FORCEPS, BIOPSY: Performed by: INTERNAL MEDICINE

## 2024-03-15 PROCEDURE — 37000008 HC ANESTHESIA 1ST 15 MINUTES: Performed by: INTERNAL MEDICINE

## 2024-03-15 PROCEDURE — 45385 COLONOSCOPY W/LESION REMOVAL: CPT | Performed by: INTERNAL MEDICINE

## 2024-03-15 PROCEDURE — 27201114 HC TRAP (ANY): Performed by: INTERNAL MEDICINE

## 2024-03-15 PROCEDURE — 37000009 HC ANESTHESIA EA ADD 15 MINS: Performed by: INTERNAL MEDICINE

## 2024-03-15 PROCEDURE — 63600175 PHARM REV CODE 636 W HCPCS: Performed by: NURSE ANESTHETIST, CERTIFIED REGISTERED

## 2024-03-15 RX ORDER — DIPHENHYDRAMINE HYDROCHLORIDE 50 MG/ML
50 INJECTION INTRAMUSCULAR; INTRAVENOUS ONCE AS NEEDED
Status: DISCONTINUED | OUTPATIENT
Start: 2024-03-15 | End: 2024-03-15 | Stop reason: HOSPADM

## 2024-03-15 RX ORDER — LIDOCAINE HYDROCHLORIDE 20 MG/ML
INJECTION INTRAVENOUS
Status: DISCONTINUED | OUTPATIENT
Start: 2024-03-15 | End: 2024-03-15

## 2024-03-15 RX ORDER — PROPOFOL 10 MG/ML
VIAL (ML) INTRAVENOUS
Status: DISCONTINUED | OUTPATIENT
Start: 2024-03-15 | End: 2024-03-15

## 2024-03-15 RX ADMIN — PROPOFOL 50 MG: 10 INJECTION, EMULSION INTRAVENOUS at 08:03

## 2024-03-15 RX ADMIN — LIDOCAINE HYDROCHLORIDE 20 MG: 20 INJECTION, SOLUTION INTRAVENOUS at 08:03

## 2024-03-15 RX ADMIN — SODIUM CHLORIDE: 0.9 INJECTION, SOLUTION INTRAVENOUS at 08:03

## 2024-03-15 NOTE — PROVATION PATIENT INSTRUCTIONS
Discharge Summary/Instructions after an Endoscopic Procedure  Patient Name: Nan Caraballo  Patient MRN: [id number]  Patient YOB: 1965  Friday, March 15, 2024  Danika Thao MD  RESTRICTIONS:  During your procedure today, you received medications for sedation.  These   medications may affect your judgment, balance and coordination.  Therefore,   for 24 hours, you have the following restrictions:   - DO NOT drive a car, operate machinery, make legal/financial decisions,   sign important papers or drink alcohol.    ACTIVITY:  Today: no heavy lifting, straining or running due to procedural   sedation/anesthesia.  The following day: return to full activity including work.  DIET:  Eat and drink normally unless instructed otherwise.     TREATMENT FOR COMMON SIDE EFFECTS:  - Mild abdominal pain, nausea, belching, bloating or excessive gas:  rest,   eat lightly and use a heating pad.  - Sore Throat: treat with throat lozenges and/or gargle with warm salt   water.  - Because air was used during the procedure, expelling large amounts of air   from your rectum or belching is normal.  - If a bowel prep was taken, you may not have a bowel movement for 1-3 days.    This is normal.  SYMPTOMS TO WATCH FOR AND REPORT TO YOUR PHYSICIAN:  1. Abdominal pain or bloating, other than gas cramps.  2. Chest pain.  3. Back pain.  4. Signs of infection such as: chills or fever occurring within 24 hours   after the procedure.  5. Rectal bleeding, which would show as bright red, maroon, or black stools.   (A tablespoon of blood from the rectum is not serious, especially if   hemorrhoids are present.)  6. Vomiting.  7. Weakness or dizziness.  GO DIRECTLY TO THE NEAREST EMERGENCY ROOM IF YOU HAVE ANY OF THE FOLLOWING:      Difficulty breathing              Chills and/or fever over 101 F   Persistent vomiting and/or vomiting blood   Severe abdominal pain   Severe chest pain   Black, tarry stools   Bleeding- more than one  tablespoon   Any other symptom or condition that you feel may need urgent attention  Your doctor recommends these additional instructions:  If any biopsies were taken, your doctors clinic will contact you in 1 to 2   weeks with any results.  - Await pathology results.   - Repeat colonoscopy in 5 years for surveillance.   - Discharge patient to home (with escort).  For questions, problems or results please call your physician - Danika Thao MD at Work:  [phone].  [facility], EMERGENCY ROOM PHONE NUMBER: [phone]  IF A COMPLICATION OR EMERGENCY SITUATION ARISES AND YOU ARE UNABLE TO REACH   YOUR PHYSICIAN - GO DIRECTLY TO THE EMERGENCY ROOM.  Danika Thao MD  3/15/2024 9:07:45 AM  This report has been verified and signed electronically.  Dear patient,  As a result of recent federal legislation (The Federal Cures Act), you may   receive lab or pathology results from your procedure in your [facility]   account before your physician is able to contact you. Your physician or   their representative will relay the results to you with their   recommendations at their soonest availability.  Thank you,  PROVATION

## 2024-03-15 NOTE — ANESTHESIA POSTPROCEDURE EVALUATION
Anesthesia Post Evaluation    Patient: Nan Caraballo    Procedure(s) Performed: Procedure(s) (LRB):  COLONOSCOPY (N/A)    Final Anesthesia Type: general      Patient location during evaluation: GI PACU  Patient participation: Yes- Able to Participate  Level of consciousness: awake and alert  Post-procedure vital signs: reviewed and stable  Pain management: adequate  Airway patency: patent    PONV status at discharge: No PONV  Anesthetic complications: no      Cardiovascular status: stable  Respiratory status: unassisted  Hydration status: euvolemic  Follow-up not needed.              Vitals Value Taken Time   /65 03/15/24 0915   Temp 36.7 °C (98 °F) 03/15/24 0913   Pulse 75 03/15/24 0917   Resp 18 03/15/24 0913   SpO2 99 % 03/15/24 0917   Vitals shown include unvalidated device data.      No case tracking events are documented in the log.      Pain/Leandra Score: Leandra Score: 10 (3/15/2024  9:13 AM)

## 2024-03-15 NOTE — TRANSFER OF CARE
Anesthesia Transfer of Care Note    Patient: Nan Caraballo    Procedure(s) Performed: Procedure(s) (LRB):  COLONOSCOPY (N/A)    Patient location: GI    Anesthesia Type: general    Transport from OR: Transported from OR on room air with adequate spontaneous ventilation    Post pain: adequate analgesia    Post assessment: no apparent anesthetic complications    Post vital signs: stable    Level of consciousness: awake and alert    Nausea/Vomiting: no nausea/vomiting    Complications: none    Transfer of care protocol was followed      Last vitals: Visit Vitals  BP (!) 157/74 (BP Location: Left arm, Patient Position: Lying)   Pulse 96   Temp 37.9 °C (100.2 °F) (Tympanic)   Resp 18   LMP 10/19/2018 (Approximate)   SpO2 95%   Breastfeeding No      No

## 2024-03-15 NOTE — ANESTHESIA PREPROCEDURE EVALUATION
03/15/2024  Nan Caraballo is a 59 y.o., female.      Pre-op Assessment    I have reviewed the Patient Summary Reports.     I have reviewed the Nursing Notes. I have reviewed the NPO Status.   I have reviewed the Medications.     Review of Systems  Anesthesia Hx:             Denies Family Hx of Anesthesia complications.    Denies Personal Hx of Anesthesia complications.                    Social:  Former Smoker       Hematology/Oncology:  Hematology Normal   Oncology Normal                Hematology Comments: History of DVT 1987.  No blood thinners currently                    EENT/Dental:  EENT/Dental Normal           Cardiovascular:     Hypertension (patient took herself off of medications years ago when her blood pressure seemed to normalize)           hyperlipidemia                             Pulmonary:  Pulmonary Normal                       Renal/:  Renal/ Normal                 Hepatic/GI:  Hepatic/GI Normal                 Musculoskeletal:  Musculoskeletal Normal                Neurological:  Neurology Normal          Spina bifida with paraplegia, uses wheelchair.  History of childhood hydrocephalus                            Endocrine:  Endocrine Normal            Psych:  Psychiatric Normal                    Physical Exam  General: Well nourished, Cooperative, Alert and Oriented    Airway:  Mallampati: II / I  Mouth Opening: Normal  TM Distance: > 6 cm  Tongue: Normal  Neck ROM: Normal ROM    Dental:  Intact    Chest/Lungs:  Clear to auscultation, Normal Respiratory Rate    Heart:  Rate: Normal  Rhythm: Regular Rhythm  Sounds: Normal        Anesthesia Plan  Type of Anesthesia, risks & benefits discussed:    Anesthesia Type: Gen Natural Airway  Intra-op Monitoring Plan: Standard ASA Monitors  Induction:  IV  Informed Consent: Informed consent signed with the Patient and all parties understand  the risks and agree with anesthesia plan.  All questions answered.   ASA Score: 3    Ready For Surgery From Anesthesia Perspective.     .

## 2024-03-15 NOTE — H&P
GASTROENTEROLOGY PRE-PROCEDURE H&P NOTE  Patient Name: Nan Caraballo  Patient MRN: [id number]  Patient : 1965    Service date: 3/14/2024    PCP: Alda, Primary Doctor    No chief complaint on file.      HPI: Patient is a 58 yo female reports postprandial epigastric pain that started in january and it increased in frequency and severity and she went to urgent care. she cut out dailry and started a bland diet. she went to urgent care and they gave her omeprazole and sucralfate and after a few weeks it did get better.  she is off omeprazole but eggs sitll cause episodes.    she doesn't like going to the doctor but takes her bp at home where she reports it's normal    Past Medical History:  Past Medical History:   Diagnosis Date    DVT (deep venous thrombosis)     2 post delivery; left leg    Essential hypertension 2016    History of hydrocephalus as a child     Hyperlipidemia 2014    Hypertensive retinopathy     Spina bifida         Past Surgical History:  Past Surgical History:   Procedure Laterality Date     SECTION       SECTION WITH TUBAL LIGATION      SHUNT REMOVAL      as child    spina bifida surgery      x13 surgeries        Home Medications:  No medications prior to admission.               Review of patient's allergies indicates:   Allergen Reactions    Codeine Itching       Social History:   Social History     Occupational History    Not on file   Tobacco Use    Smoking status: Former    Smokeless tobacco: Never   Substance and Sexual Activity    Alcohol use: No    Drug use: No    Sexual activity: Not on file       Family History:   Family History   Problem Relation Age of Onset    Diabetes Maternal Grandmother     Hyperlipidemia Mother     Hyperlipidemia Father        Review of Systems:  A 10 point review of systems was performed and was normal, except as mentioned in the HPI, including constitutional, HEENT, heme, lymph, cardiovascular, respiratory, gastrointestinal,  "genitourinary, neurologic, endocrine, psychiatric and musculoskeletal.      OBJECTIVE:    Physical Exam:  24 Hour Vital Sign Ranges:    Most recent vitals: LMP 10/19/2018 (Approximate)    GEN: well-developed, well-nourished, awake and alert, non-toxic appearing adult  HEENT: PERRL, sclera anicteric, oral mucosa pink and moist without lesion  NECK: trachea midline; Good ROM  CV: regular rate and rhythm, no murmurs or gallops  RESP: clear to auscultation bilaterally, no wheezes, rhonci or rales  ABD: soft, non-tender, non-distended, normal bowel sounds  EXT: no swelling or edema, 2+ pulses distally  SKIN: no rashes or jaundice  PSYCH: normal affect    Labs:   Recent Labs     03/12/24  1024   WBC 4.24   MCV 94        Recent Labs     03/12/24  1024      K 3.4*      CO2 23   BUN 14        No results for input(s): "ALB" in the last 72 hours.    Invalid input(s): "ALKP", "SGOT", "SGPT", "TBIL", "DBIL", "TPRO"  No results for input(s): "PT", "INR", "PTT" in the last 72 hours.  Us 8/31/23  Diffuse gallbladder wall thickening with multiple foci of ring down artifact originating from the gallbladder wall. The findings are compatible with adenomyomatosis.     Partial obscuration of the pancreas    IMPRESSION / RECOMMENDATIONS:  Adenomyomatosis of gallbladder  Positive colorectal cancer screening using Cologuard test  Assessment:  8/31/23 - if anemic, colon and egd. If not, conservative mgmt with monitoring and stool cologuard    1/22/24 -Adenomyomatosis -- Patients with typical features of adenomyomatosis on ultrasound do not require surveillance or cholecystectomy.  Patient is currently asymptomatic at this point   Plan:   Colonoscopy with Anesthesia Provider/CRNA who is hereby directed and authorized to administer anesthesia  Plenvu 140-9-5.2 gram Take 1 kit by mouth split dose as directed    with interventions as warranted.   RIsks, benefits, alternatives discussed in detail regarding upcoming " procedures and sedation. Some of the more common endoscopic complications include but not limited to immediate or delayed perforation, bleeding, infections, pain, inadvertent injury to surrounding tissue / organs and possible need for surgical evaluation. Patient expressed understanding, all questions answered and will proceed with procedure as planned.     Danika Taho  3/14/2024  9:47 PM

## 2024-03-28 LAB
OHS QRS DURATION: 80 MS
OHS QTC CALCULATION: 413 MS

## 2024-04-11 DIAGNOSIS — Z13.6 ENCOUNTER FOR SCREENING FOR CORONARY ARTERY DISEASE: ICD-10-CM

## 2024-04-11 DIAGNOSIS — R74.01 TRANSAMINITIS: Primary | ICD-10-CM

## 2024-04-16 ENCOUNTER — HOSPITAL ENCOUNTER (OUTPATIENT)
Dept: RADIOLOGY | Facility: HOSPITAL | Age: 59
Discharge: HOME | End: 2024-04-16
Attending: INTERNAL MEDICINE
Payer: COMMERCIAL

## 2024-04-16 DIAGNOSIS — Z13.6 ENCOUNTER FOR SCREENING FOR CORONARY ARTERY DISEASE: ICD-10-CM

## 2024-04-16 DIAGNOSIS — R74.01 TRANSAMINITIS: ICD-10-CM

## 2024-04-16 PROCEDURE — 76705 ECHO EXAM OF ABDOMEN: CPT | Mod: 26,,, | Performed by: RADIOLOGY

## 2024-04-16 PROCEDURE — 76705 ECHO EXAM OF ABDOMEN: CPT | Mod: TC

## 2024-04-19 ENCOUNTER — HOSPITAL ENCOUNTER (OUTPATIENT)
Dept: RADIOLOGY | Facility: HOSPITAL | Age: 59
Discharge: HOME | End: 2024-04-19
Attending: INTERNAL MEDICINE
Payer: COMMERCIAL

## 2024-05-02 ENCOUNTER — HOSPITAL ENCOUNTER (OUTPATIENT)
Dept: RADIOLOGY | Facility: HOSPITAL | Age: 59
Discharge: HOME | End: 2024-05-02
Attending: INTERNAL MEDICINE
Payer: COMMERCIAL

## 2024-05-02 PROCEDURE — 75571 CT HRT W/O DYE W/CA TEST: CPT | Mod: 26,,, | Performed by: RADIOLOGY

## 2024-05-02 PROCEDURE — 75571 CT HRT W/O DYE W/CA TEST: CPT | Mod: TC

## 2025-03-03 DIAGNOSIS — Z12.31 OTHER SCREENING MAMMOGRAM: Primary | ICD-10-CM

## 2025-03-06 ENCOUNTER — HOSPITAL ENCOUNTER (OUTPATIENT)
Dept: RADIOLOGY | Facility: HOSPITAL | Age: 60
Discharge: HOME | End: 2025-03-06
Attending: INTERNAL MEDICINE
Payer: COMMERCIAL

## 2025-03-06 VITALS — WEIGHT: 141.13 LBS | BODY MASS INDEX: 32.66 KG/M2 | HEIGHT: 55 IN

## 2025-03-06 DIAGNOSIS — Z12.31 OTHER SCREENING MAMMOGRAM: ICD-10-CM

## 2025-03-06 PROCEDURE — 77067 SCR MAMMO BI INCL CAD: CPT | Mod: 26,,, | Performed by: RADIOLOGY

## 2025-03-06 PROCEDURE — 77063 BREAST TOMOSYNTHESIS BI: CPT | Mod: TC,PO

## 2025-03-06 PROCEDURE — 77063 BREAST TOMOSYNTHESIS BI: CPT | Mod: 26,,, | Performed by: RADIOLOGY
